# Patient Record
Sex: MALE | Race: WHITE | Employment: FULL TIME | URBAN - METROPOLITAN AREA
[De-identification: names, ages, dates, MRNs, and addresses within clinical notes are randomized per-mention and may not be internally consistent; named-entity substitution may affect disease eponyms.]

---

## 2017-03-30 ENCOUNTER — OFFICE VISIT (OUTPATIENT)
Dept: INTERNAL MEDICINE CLINIC | Facility: CLINIC | Age: 57
End: 2017-03-30

## 2017-03-30 ENCOUNTER — APPOINTMENT (OUTPATIENT)
Dept: LAB | Age: 57
End: 2017-03-30
Attending: INTERNAL MEDICINE
Payer: MEDICAID

## 2017-03-30 VITALS
WEIGHT: 203.31 LBS | HEART RATE: 71 BPM | HEIGHT: 72 IN | SYSTOLIC BLOOD PRESSURE: 127 MMHG | BODY MASS INDEX: 27.54 KG/M2 | DIASTOLIC BLOOD PRESSURE: 70 MMHG | TEMPERATURE: 98 F

## 2017-03-30 DIAGNOSIS — M25.562 CHRONIC PAIN OF LEFT KNEE: ICD-10-CM

## 2017-03-30 DIAGNOSIS — R31.29 MICROHEMATURIA: ICD-10-CM

## 2017-03-30 DIAGNOSIS — E78.2 MIXED HYPERLIPIDEMIA: ICD-10-CM

## 2017-03-30 DIAGNOSIS — D22.9 NUMEROUS MOLES: ICD-10-CM

## 2017-03-30 DIAGNOSIS — G89.29 CHRONIC PAIN OF LEFT KNEE: ICD-10-CM

## 2017-03-30 DIAGNOSIS — I10 ESSENTIAL HYPERTENSION: Primary | ICD-10-CM

## 2017-03-30 PROCEDURE — 80061 LIPID PANEL: CPT | Performed by: INTERNAL MEDICINE

## 2017-03-30 PROCEDURE — 99212 OFFICE O/P EST SF 10 MIN: CPT | Performed by: INTERNAL MEDICINE

## 2017-03-30 PROCEDURE — 85025 COMPLETE CBC W/AUTO DIFF WBC: CPT | Performed by: INTERNAL MEDICINE

## 2017-03-30 PROCEDURE — 80053 COMPREHEN METABOLIC PANEL: CPT | Performed by: INTERNAL MEDICINE

## 2017-03-30 PROCEDURE — 84443 ASSAY THYROID STIM HORMONE: CPT | Performed by: INTERNAL MEDICINE

## 2017-03-30 PROCEDURE — 99214 OFFICE O/P EST MOD 30 MIN: CPT | Performed by: INTERNAL MEDICINE

## 2017-03-30 PROCEDURE — 81001 URINALYSIS AUTO W/SCOPE: CPT | Performed by: INTERNAL MEDICINE

## 2017-03-30 PROCEDURE — 83036 HEMOGLOBIN GLYCOSYLATED A1C: CPT | Performed by: INTERNAL MEDICINE

## 2017-03-30 RX ORDER — AMLODIPINE BESYLATE AND BENAZEPRIL HYDROCHLORIDE 5; 10 MG/1; MG/1
CAPSULE ORAL
Qty: 90 CAPSULE | Refills: 1 | Status: SHIPPED | OUTPATIENT
Start: 2017-03-30 | End: 2017-11-02

## 2017-03-30 NOTE — PROGRESS NOTES
HPI:    Patient ID: Marly Gonzalez is a 62year old male. Hypertension  This is a chronic problem. The current episode started more than 1 year ago. The problem is controlled.  Pertinent negatives include no chest pain, peripheral edema or shortness of colby HENT: Negative. Eyes: Negative. Respiratory: Negative. Negative for cough and shortness of breath. No hemoptysis   Cardiovascular: Negative. Negative for chest pain. Gastrointestinal: Negative. Genitourinary: Negative.     Musculoskel significant amount of weight which should also help his cholesterol levels.    (D22.9) Numerous moles  Plan: Patient had been seen before for numerous moles on the back and some of them atypical and was advised him to see a dermatologist but that did not d

## 2017-03-31 ENCOUNTER — TELEPHONE (OUTPATIENT)
Dept: INTERNAL MEDICINE CLINIC | Facility: CLINIC | Age: 57
End: 2017-03-31

## 2017-03-31 DIAGNOSIS — R73.09 ELEVATED GLUCOSE: Primary | ICD-10-CM

## 2017-04-01 ENCOUNTER — TELEPHONE (OUTPATIENT)
Dept: INTERNAL MEDICINE CLINIC | Facility: CLINIC | Age: 57
End: 2017-04-01

## 2017-04-01 RX ORDER — ATORVASTATIN CALCIUM 10 MG/1
10 TABLET, FILM COATED ORAL NIGHTLY
Qty: 90 TABLET | Refills: 0 | Status: SHIPPED | OUTPATIENT
Start: 2017-04-01 | End: 2019-10-05

## 2017-04-24 ENCOUNTER — TELEPHONE (OUTPATIENT)
Dept: INTERNAL MEDICINE CLINIC | Facility: CLINIC | Age: 57
End: 2017-04-24

## 2017-04-24 DIAGNOSIS — E78.00 ELEVATED CHOLESTEROL: Primary | ICD-10-CM

## 2017-04-24 NOTE — TELEPHONE ENCOUNTER
Spoke with patient (verified name and ), reviewed information, patient verbalized understanding and agrees with plan. He has an appt scheduled to see Dr. Paola Zarate on 17. He is aware he is to repeat lipid profile and f/u in office in 3 months.  Aware

## 2017-04-24 NOTE — TELEPHONE ENCOUNTER
LMTCB, please transfer to ext [phone] today or [phone].  Thank you      Patient Result Comments        Entered by Marco Antonio Bagley MD at 4/1/2017  6:20 PM       HI Mr Rodney Schuster,     Here are your lab results   Your cholesterol levels remains elevated so I wan

## 2017-05-01 ENCOUNTER — OFFICE VISIT (OUTPATIENT)
Dept: SURGERY | Facility: CLINIC | Age: 57
End: 2017-05-01

## 2017-05-01 ENCOUNTER — TELEPHONE (OUTPATIENT)
Dept: INTERNAL MEDICINE CLINIC | Facility: CLINIC | Age: 57
End: 2017-05-01

## 2017-05-01 VITALS
SYSTOLIC BLOOD PRESSURE: 148 MMHG | DIASTOLIC BLOOD PRESSURE: 73 MMHG | BODY MASS INDEX: 27.36 KG/M2 | HEIGHT: 72 IN | HEART RATE: 69 BPM | RESPIRATION RATE: 16 BRPM | TEMPERATURE: 98 F | WEIGHT: 202 LBS

## 2017-05-01 DIAGNOSIS — Z11.3 SCREEN FOR STD (SEXUALLY TRANSMITTED DISEASE): Primary | ICD-10-CM

## 2017-05-01 DIAGNOSIS — R31.29 MICROHEMATURIA: Primary | ICD-10-CM

## 2017-05-01 PROCEDURE — 99244 OFF/OP CNSLTJ NEW/EST MOD 40: CPT | Performed by: UROLOGY

## 2017-05-01 PROCEDURE — 99212 OFFICE O/P EST SF 10 MIN: CPT | Performed by: UROLOGY

## 2017-05-01 NOTE — PROGRESS NOTES
SUBJECTIVE:  Danny Mckeon is a 62year old male who presents for a consultation at the request of, and a copy of this note will be sent to, Dr. Faisal Israel, for evaluation of  Microhematuria and erectile dysfunction.   He was actually seen in 4/2011 for ED a 69  Temp(Src) 98.2 °F (36.8 °C) (Oral)  Resp 16  Ht 6' (1.829 m)  Wt 202 lb (91.627 kg)  BMI 27.39 kg/m2  He appears well, in no apparent distress. Alert and oriented times three, pleasant and cooperative.   CARDIOVASCULAR:normal apical impulse  RESPIRATOR

## 2017-11-02 RX ORDER — AMLODIPINE BESYLATE AND BENAZEPRIL HYDROCHLORIDE 5; 10 MG/1; MG/1
CAPSULE ORAL
Qty: 30 CAPSULE | Refills: 0 | Status: SHIPPED | OUTPATIENT
Start: 2017-11-02 | End: 2017-11-30

## 2017-11-30 RX ORDER — AMLODIPINE BESYLATE AND BENAZEPRIL HYDROCHLORIDE 5; 10 MG/1; MG/1
CAPSULE ORAL
Qty: 30 CAPSULE | Refills: 0 | Status: SHIPPED | OUTPATIENT
Start: 2017-11-30 | End: 2018-12-10

## 2017-12-07 RX ORDER — AMLODIPINE BESYLATE AND BENAZEPRIL HYDROCHLORIDE 5; 10 MG/1; MG/1
CAPSULE ORAL
Qty: 30 CAPSULE | Refills: 0 | Status: SHIPPED | OUTPATIENT
Start: 2017-12-07 | End: 2019-02-08

## 2017-12-10 RX ORDER — AMLODIPINE BESYLATE AND BENAZEPRIL HYDROCHLORIDE 5; 10 MG/1; MG/1
CAPSULE ORAL
Qty: 30 CAPSULE | Refills: 0 | Status: SHIPPED | OUTPATIENT
Start: 2017-12-10 | End: 2019-02-08

## 2017-12-12 RX ORDER — AMLODIPINE BESYLATE AND BENAZEPRIL HYDROCHLORIDE 5; 10 MG/1; MG/1
CAPSULE ORAL
Qty: 30 CAPSULE | Refills: 0 | Status: SHIPPED | OUTPATIENT
Start: 2017-12-12 | End: 2019-02-08

## 2017-12-12 NOTE — TELEPHONE ENCOUNTER
I had already approved this twice so I Dont know why this keeps on going back to me; he also needs ffup ov so will only refill for 30 days

## 2017-12-13 RX ORDER — AMLODIPINE BESYLATE AND BENAZEPRIL HYDROCHLORIDE 5; 10 MG/1; MG/1
CAPSULE ORAL
Qty: 30 CAPSULE | Refills: 0 | Status: SHIPPED | OUTPATIENT
Start: 2017-12-13 | End: 2019-02-08

## 2017-12-13 NOTE — TELEPHONE ENCOUNTER
Pt needs ffup ov; I had sent messages regarding this  Refill so many times already.  I will give him only 30 days and no further refill if no ffup ov

## 2018-12-03 RX ORDER — AMLODIPINE BESYLATE AND BENAZEPRIL HYDROCHLORIDE 5; 10 MG/1; MG/1
CAPSULE ORAL
Qty: 30 CAPSULE | Refills: 0 | OUTPATIENT
Start: 2018-12-03

## 2018-12-07 NOTE — TELEPHONE ENCOUNTER
Patient calling and requesting a follow up regarding his medication amlodipine, states that he does not have any insurance right now and will  Be going out of town, [address] [place] [address] 3/30/17 and no latest blood tests, cannot make any FU OV any time soon.   Dr Flores=

## 2018-12-10 RX ORDER — AMLODIPINE BESYLATE AND BENAZEPRIL HYDROCHLORIDE 5; 10 MG/1; MG/1
CAPSULE ORAL
Qty: 30 CAPSULE | Refills: 0 | Status: SHIPPED | OUTPATIENT
Start: 2018-12-10 | End: 2019-02-08

## 2018-12-10 NOTE — TELEPHONE ENCOUNTER
CSS- call pt assist in scheduling appt then route back to Rn for refill consideration.   See message below

## 2018-12-10 NOTE — TELEPHONE ENCOUNTER
Have not seen him for more than  one year; really needs to have a ffup ov.  Ok to give him 30 day refill of his amlodipine but will need ov for further refills

## 2019-02-08 RX ORDER — AMLODIPINE BESYLATE AND BENAZEPRIL HYDROCHLORIDE 5; 10 MG/1; MG/1
CAPSULE ORAL
Qty: 90 CAPSULE | Refills: 0 | Status: SHIPPED | OUTPATIENT
Start: 2019-02-08 | End: 2019-08-20

## 2019-02-08 NOTE — TELEPHONE ENCOUNTER
CSS please call pt and assist in schedule overdue follow up or Px. Once scheduled or after 3 attempts, please route back for refill review.     Hypertensive Medications  Protocol Criteria:  · Appointment scheduled in the past 6 months or in the next 3 month

## 2019-02-08 NOTE — TELEPHONE ENCOUNTER
Spoke with Pt made appt for 2/23 for follow up    Pt would like to know if he can have his BP medication because he is not feelling and he is getting head ache and BP is high

## 2019-02-08 NOTE — TELEPHONE ENCOUNTER
Current Outpatient Medications:   •  Amlodipine Besy-Benazepril HCl 5-10 MG Oral Cap, TAKE ONE CAPSULE BY MOUTH ONCE DAILY, Disp: 30 capsule, Rfl: 0

## 2019-02-08 NOTE — TELEPHONE ENCOUNTER
Spoke with patient--has f/u appt with EL-2/23/19--patient declines ER for headache and high blood pressure. \"I have been out of the medication for a week. I didn't know I needed an appointment. \"    Relayed to patient to go to ER if symptoms worsen prior

## 2019-02-08 NOTE — TELEPHONE ENCOUNTER
Dr Rocael Alfaro,    See message below and advise on pended amlodipine-besy-benazepril 5-10 mg.     Please reply to katharina: LILIYA Gonzalez

## 2019-05-24 RX ORDER — AMLODIPINE BESYLATE AND BENAZEPRIL HYDROCHLORIDE 5; 10 MG/1; MG/1
CAPSULE ORAL
Qty: 30 CAPSULE | Refills: 0 | OUTPATIENT
Start: 2019-05-24

## 2019-07-23 RX ORDER — AMLODIPINE BESYLATE AND BENAZEPRIL HYDROCHLORIDE 5; 10 MG/1; MG/1
CAPSULE ORAL
Qty: 90 CAPSULE | Refills: 0 | OUTPATIENT
Start: 2019-07-23

## 2019-08-20 ENCOUNTER — TELEPHONE (OUTPATIENT)
Dept: INTERNAL MEDICINE CLINIC | Facility: CLINIC | Age: 59
End: 2019-08-20

## 2019-08-20 RX ORDER — AMLODIPINE BESYLATE AND BENAZEPRIL HYDROCHLORIDE 5; 10 MG/1; MG/1
CAPSULE ORAL
Qty: 30 CAPSULE | Refills: 0 | Status: SHIPPED | OUTPATIENT
Start: 2019-08-20 | End: 2019-10-05

## 2019-08-20 NOTE — TELEPHONE ENCOUNTER
Pt requesting a refill for BP medication and already made an APPT to see PCP on 8/27. Pharmacy: [facility] in [place]    Please call Pt when this is refilled. PT is OUT of med.

## 2019-08-20 NOTE — TELEPHONE ENCOUNTER
Mane Richards from [address] stated that pt does not have insurance. Pharmacy suggest to split it. Med:   amLODIPine Besy-Benazepril HCl 5-10 MG Oral Cap    Please call pharmacy for new order. Thank you.

## 2019-08-21 RX ORDER — AMLODIPINE BESYLATE AND BENAZEPRIL HYDROCHLORIDE 5; 10 MG/1; MG/1
CAPSULE ORAL
Qty: 90 CAPSULE | Refills: 0 | OUTPATIENT
Start: 2019-08-21

## 2019-08-24 ENCOUNTER — TELEPHONE (OUTPATIENT)
Dept: INTERNAL MEDICINE CLINIC | Facility: CLINIC | Age: 59
End: 2019-08-24

## 2019-08-24 NOTE — TELEPHONE ENCOUNTER
Sumi[facility]- [place], [place] calling Again to inform the office (as previously been noted) that:    1. Pt is OUT of med (pharmacy gave them only 5 pills)    2.  Per pharmacy Pt has [employer] and med is too expensive and pharmacy is requesting a c

## 2019-08-24 NOTE — TELEPHONE ENCOUNTER
Called pharmacy and spoke to [place] the pharmacist. She informed that there are many cheaper meds and she advised that it would be up to the MD to choose but gave 2 examples of alternative options instead of the amLODIPine Besy-Benazepril HCl 5-10 MG Oral C

## 2019-08-29 NOTE — TELEPHONE ENCOUNTER
Spoke with patient, informed of EL notes below  Patient made aware no further refills will be given without seeing EL first. States he is changing insurance, will have his new insurance  card next week and will make an appointment

## 2019-08-29 NOTE — TELEPHONE ENCOUNTER
Tangela/Sadi [phone] is calling for status of their message. Tangela, would like to know if the doctor would prescribe a cheaper medication to the patient. Pt is out of medication.

## 2019-08-29 NOTE — TELEPHONE ENCOUNTER
Last time I saw pt ws 2 1/2 yrs ago; he didn't show up for is ov this week even. I can change med. He needs to see me in clnic.  I had been considerate already giving him 30 day refill without ov just to tide him over til he comes in

## 2019-10-05 ENCOUNTER — OFFICE VISIT (OUTPATIENT)
Dept: INTERNAL MEDICINE CLINIC | Facility: CLINIC | Age: 59
End: 2019-10-05
Payer: COMMERCIAL

## 2019-10-05 ENCOUNTER — HOSPITAL ENCOUNTER (OUTPATIENT)
Dept: GENERAL RADIOLOGY | Age: 59
Discharge: HOME | End: 2019-10-05
Attending: INTERNAL MEDICINE
Payer: COMMERCIAL

## 2019-10-05 VITALS
HEART RATE: 60 BPM | BODY MASS INDEX: 26.8 KG/M2 | DIASTOLIC BLOOD PRESSURE: 66 MMHG | HEIGHT: 72 IN | WEIGHT: 197.88 LBS | TEMPERATURE: 98 F | SYSTOLIC BLOOD PRESSURE: 144 MMHG

## 2019-10-05 DIAGNOSIS — E78.2 MIXED HYPERLIPIDEMIA: ICD-10-CM

## 2019-10-05 DIAGNOSIS — Z23 IMMUNIZATION DUE: ICD-10-CM

## 2019-10-05 DIAGNOSIS — M54.2 NECK PAIN: ICD-10-CM

## 2019-10-05 DIAGNOSIS — I10 ESSENTIAL HYPERTENSION: ICD-10-CM

## 2019-10-05 DIAGNOSIS — R20.0 RIGHT SIDED NUMBNESS: ICD-10-CM

## 2019-10-05 DIAGNOSIS — Z00.00 ANNUAL PHYSICAL EXAM: Primary | ICD-10-CM

## 2019-10-05 PROCEDURE — 90686 IIV4 VACC NO PRSV 0.5 ML IM: CPT | Performed by: INTERNAL MEDICINE

## 2019-10-05 PROCEDURE — 90471 IMMUNIZATION ADMIN: CPT | Performed by: INTERNAL MEDICINE

## 2019-10-05 PROCEDURE — 99396 PREV VISIT EST AGE 40-64: CPT | Performed by: INTERNAL MEDICINE

## 2019-10-05 PROCEDURE — 93000 ELECTROCARDIOGRAM COMPLETE: CPT | Performed by: INTERNAL MEDICINE

## 2019-10-05 PROCEDURE — 72040 X-RAY EXAM NECK SPINE 2-3 VW: CPT | Performed by: INTERNAL MEDICINE

## 2019-10-05 RX ORDER — AMLODIPINE BESYLATE AND BENAZEPRIL HYDROCHLORIDE 5; 20 MG/1; MG/1
1 CAPSULE ORAL DAILY
Qty: 90 CAPSULE | Refills: 1 | Status: SHIPPED | OUTPATIENT
Start: 2019-10-05 | End: 2020-06-23

## 2019-10-05 NOTE — PROGRESS NOTES
HPI:    Patient ID: Dilcia Hughes is a 61year old male. Patient presents today for his annual phyiscal. He has a known history of hypertension and hyperlipidemia but has been known to be noncompliant on his ffup visits as well as taking his medications. aggravating his hyperlipidemia. Pertinent negatives include no chest pain or shortness of breath. Current antihyperlipidemic treatment includes statins, diet change and exercise. The current treatment provides no improvement of lipids.  Compliance problems Constitutional: He is oriented to person, place, and time. He appears well-developed and well-nourished. HENT:   Head: Normocephalic and atraumatic.    Right Ear: External ear normal.   Left Ear: External ear normal.   Nose: Nose normal.   Mouth/Throat: DIAG         IMG (CPT=93880), CARD ECHO 2D DOPPLER         (CPT=93306)        Neuro exam normal; last episode a week ago already and lasted for 5  Minutes so possible TIA.  We did do ekg and showed SR rate of 64/min, normal axis, no arrhythmias, no ST T wav

## 2019-10-06 PROBLEM — M54.2 NECK PAIN: Status: ACTIVE | Noted: 2019-10-06

## 2019-10-06 PROBLEM — R20.0 RIGHT SIDED NUMBNESS: Status: ACTIVE | Noted: 2019-10-06

## 2019-10-06 PROBLEM — Z23 IMMUNIZATION DUE: Status: ACTIVE | Noted: 2019-10-06

## 2019-10-07 ENCOUNTER — TELEPHONE (OUTPATIENT)
Dept: INTERNAL MEDICINE CLINIC | Facility: CLINIC | Age: 59
End: 2019-10-07

## 2019-10-07 DIAGNOSIS — R07.2 PRECORDIAL PAIN: Primary | ICD-10-CM

## 2019-10-07 NOTE — TELEPHONE ENCOUNTER
Per patient he would like to add a stress testing to his orders. Please advise. He was in the hospital last night for chest pain. Hospital suggested stress test and he would like to have that added as well. Thank you.

## 2019-10-07 NOTE — TELEPHONE ENCOUNTER
Dr. Gail Elam, patient was in the ER on 10/05/2019 for chest pain. Please see care everywhere [address] [place] [address] for notes. Patient is requesting a stress test. Please advise if patient should have a ER follow up or if test may be ordered.

## 2019-10-07 NOTE — TELEPHONE ENCOUNTER
Late entry for 1pm today. Per Dr. Ruthie Fischer instructions called [employer] for Prior Authorization for MRI of Brain. After 45 minutes plus, authorization was denied until Dr. Margaret Peterson completes forms that were faxed.  Forms placed on Dr. Ruthie Fischer brodie

## 2019-10-14 ENCOUNTER — TELEPHONE (OUTPATIENT)
Dept: INTERNAL MEDICINE CLINIC | Facility: CLINIC | Age: 59
End: 2019-10-14

## 2019-10-14 NOTE — TELEPHONE ENCOUNTER
Phone call earlier this morning to [facility]. Automated response states order for MRI approved. Phone call to patient. S/W patient and advised MRI approved. May  order on second floor at . Order approval # [id number]. Patient states he will pick

## 2020-01-14 ENCOUNTER — APPOINTMENT (OUTPATIENT)
Dept: LAB | Age: 60
End: 2020-01-14
Attending: INTERNAL MEDICINE
Payer: COMMERCIAL

## 2020-01-14 ENCOUNTER — OFFICE VISIT (OUTPATIENT)
Dept: INTERNAL MEDICINE CLINIC | Facility: CLINIC | Age: 60
End: 2020-01-14
Payer: COMMERCIAL

## 2020-01-14 VITALS
SYSTOLIC BLOOD PRESSURE: 126 MMHG | HEIGHT: 72 IN | BODY MASS INDEX: 28.04 KG/M2 | WEIGHT: 207 LBS | TEMPERATURE: 98 F | HEART RATE: 57 BPM | DIASTOLIC BLOOD PRESSURE: 76 MMHG

## 2020-01-14 DIAGNOSIS — M54.2 NECK PAIN: Primary | ICD-10-CM

## 2020-01-14 DIAGNOSIS — M54.12 CERVICAL RADICULOPATHY: ICD-10-CM

## 2020-01-14 PROCEDURE — 99214 OFFICE O/P EST MOD 30 MIN: CPT | Performed by: INTERNAL MEDICINE

## 2020-01-14 NOTE — PROGRESS NOTES
HPI:    Patient ID: Magda Urbano is a 61year old male. Neck Pain    This is a chronic problem. The current episode started more than 1 month ago (3 months). The problem occurs constantly. The problem has been waxing and waning.  The pain is associated w murmur heard. Pulmonary/Chest: Effort normal and breath sounds normal. No respiratory distress. He has no wheezes. He has no rales. Abdominal: Soft. Bowel sounds are normal. He exhibits no distension and no mass. There is no tenderness.  There is no rebo

## 2020-06-23 ENCOUNTER — OFFICE VISIT (OUTPATIENT)
Dept: INTERNAL MEDICINE CLINIC | Facility: CLINIC | Age: 60
End: 2020-06-23
Payer: COMMERCIAL

## 2020-06-23 VITALS
DIASTOLIC BLOOD PRESSURE: 80 MMHG | HEART RATE: 64 BPM | BODY MASS INDEX: 28.84 KG/M2 | HEIGHT: 71 IN | TEMPERATURE: 98 F | RESPIRATION RATE: 12 BRPM | WEIGHT: 206 LBS | SYSTOLIC BLOOD PRESSURE: 128 MMHG

## 2020-06-23 DIAGNOSIS — E78.2 MIXED HYPERLIPIDEMIA: ICD-10-CM

## 2020-06-23 DIAGNOSIS — M54.12 CERVICAL RADICULOPATHY: ICD-10-CM

## 2020-06-23 DIAGNOSIS — Z12.5 PROSTATE CANCER SCREENING: ICD-10-CM

## 2020-06-23 DIAGNOSIS — Z12.11 COLON CANCER SCREENING: ICD-10-CM

## 2020-06-23 DIAGNOSIS — I10 ESSENTIAL HYPERTENSION: Primary | ICD-10-CM

## 2020-06-23 PROCEDURE — 99214 OFFICE O/P EST MOD 30 MIN: CPT | Performed by: INTERNAL MEDICINE

## 2020-06-23 RX ORDER — AMLODIPINE BESYLATE AND BENAZEPRIL HYDROCHLORIDE 5; 20 MG/1; MG/1
1 CAPSULE ORAL DAILY
Qty: 90 CAPSULE | Refills: 1 | Status: SHIPPED | OUTPATIENT
Start: 2020-06-23 | End: 2021-03-31

## 2020-06-23 NOTE — PROGRESS NOTES
HPI:    Patient ID: Haydee Corona is a 61year old male. Hypertension   This is a chronic problem. The current episode started more than 1 year ago. The problem has been gradually improving since onset. The problem is controlled.  Associated symptoms incl include no chest pain, fever, numbness, paresis, tingling or weakness. He has tried nothing for the symptoms. The treatment provided no relief. Review of Systems   Constitutional: Negative. Negative for fever. HENT: Negative. Eyes: Negative. no guarding. Musculoskeletal:         General: No edema. Lymphadenopathy:     He has no cervical adenopathy. Neurological: He is alert and oriented to person, place, and time. Skin: Skin is warm and dry. No rash noted. He is not diaphoretic.

## 2020-11-10 ENCOUNTER — IMMUNIZATION (OUTPATIENT)
Dept: INTERNAL MEDICINE CLINIC | Facility: CLINIC | Age: 60
End: 2020-11-10
Payer: COMMERCIAL

## 2020-11-10 DIAGNOSIS — Z23 NEED FOR VACCINATION: ICD-10-CM

## 2020-11-10 PROCEDURE — 90471 IMMUNIZATION ADMIN: CPT | Performed by: INTERNAL MEDICINE

## 2020-11-10 PROCEDURE — 90686 IIV4 VACC NO PRSV 0.5 ML IM: CPT | Performed by: INTERNAL MEDICINE

## 2021-03-31 ENCOUNTER — TELEPHONE (OUTPATIENT)
Dept: INTERNAL MEDICINE CLINIC | Facility: CLINIC | Age: 61
End: 2021-03-31

## 2021-03-31 RX ORDER — AMLODIPINE BESYLATE AND BENAZEPRIL HYDROCHLORIDE 5; 20 MG/1; MG/1
CAPSULE ORAL
Qty: 90 CAPSULE | Refills: 0 | Status: SHIPPED | OUTPATIENT
Start: 2021-03-31 | End: 2021-09-23

## 2021-03-31 NOTE — TELEPHONE ENCOUNTER
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 I told  Him I can write the letter to state he is essential worker and has his medical conditions.  He

## 2021-03-31 NOTE — TELEPHONE ENCOUNTER
Per patient he insist to send message to doctor, he states that it's not emergency, he states that he has question only, no more info given.

## 2021-09-22 NOTE — TELEPHONE ENCOUNTER
HAYLEY please assist with appt  Please review. Protocol failed/ No protocol      Requested Prescriptions   Pending Prescriptions Disp Refills    AMLODIPINE BESY-BENAZEPRIL HCL 5-20 MG Oral Cap [Pharmacy Med Name: amLODIPine Besy-Benazepril HCl 5-20 MG Oral Capsule] 90 capsule 0     Sig: Take 1 capsule by mouth once daily        Hypertensive Medications Protocol Failed - 9/22/2021  1:33 PM        Failed - CMP or BMP in past 12 months        Failed - Appointment in past 6 or next 3 months        Passed - GFR Non- > 50     Lab Results   Component Value Date    GFRNAA >60 03/30/2017                            Recent Outpatient Visits              1 year ago Essential hypertension    [facility], Gregoria Luque MD    Office Visit    1 year ago Neck pain    [facility], Gregoria Luque MD    Office Visit    1 year ago Annual physical exam    Gregoria Espinosa MD    Office Visit    4 years ago [facility] for Magdy Robbins MD    Office Visit    4 years ago Essential hypertension    Gregoria Espinosa MD    Office Visit

## 2021-09-23 RX ORDER — AMLODIPINE BESYLATE AND BENAZEPRIL HYDROCHLORIDE 5; 20 MG/1; MG/1
CAPSULE ORAL
Qty: 30 CAPSULE | Refills: 0 | Status: SHIPPED | OUTPATIENT
Start: 2021-09-23 | End: 2021-11-09

## 2021-09-23 NOTE — TELEPHONE ENCOUNTER
Needs ffup ov, last ov more than a year ago; I sent erx for 30 day only and cant refill further if no ov

## 2021-11-09 ENCOUNTER — OFFICE VISIT (OUTPATIENT)
Dept: INTERNAL MEDICINE CLINIC | Facility: CLINIC | Age: 61
End: 2021-11-09
Payer: COMMERCIAL

## 2021-11-09 VITALS
HEIGHT: 71 IN | SYSTOLIC BLOOD PRESSURE: 138 MMHG | BODY MASS INDEX: 29.26 KG/M2 | WEIGHT: 209 LBS | DIASTOLIC BLOOD PRESSURE: 84 MMHG | HEART RATE: 77 BPM

## 2021-11-09 DIAGNOSIS — Z12.5 PROSTATE CANCER SCREENING: ICD-10-CM

## 2021-11-09 DIAGNOSIS — M25.562 ACUTE PAIN OF LEFT KNEE: ICD-10-CM

## 2021-11-09 DIAGNOSIS — Z00.00 ANNUAL PHYSICAL EXAM: Primary | ICD-10-CM

## 2021-11-09 DIAGNOSIS — E78.2 MIXED HYPERLIPIDEMIA: ICD-10-CM

## 2021-11-09 DIAGNOSIS — Z12.11 COLON CANCER SCREENING: ICD-10-CM

## 2021-11-09 DIAGNOSIS — I10 ESSENTIAL HYPERTENSION: ICD-10-CM

## 2021-11-09 PROBLEM — R31.29 MICROHEMATURIA: Status: RESOLVED | Noted: 2017-03-30 | Resolved: 2021-11-09

## 2021-11-09 PROBLEM — M54.2 NECK PAIN: Status: RESOLVED | Noted: 2019-10-06 | Resolved: 2021-11-09

## 2021-11-09 PROBLEM — R20.0 RIGHT SIDED NUMBNESS: Status: RESOLVED | Noted: 2019-10-06 | Resolved: 2021-11-09

## 2021-11-09 PROCEDURE — 90732 PPSV23 VACC 2 YRS+ SUBQ/IM: CPT | Performed by: INTERNAL MEDICINE

## 2021-11-09 PROCEDURE — 90472 IMMUNIZATION ADMIN EACH ADD: CPT | Performed by: INTERNAL MEDICINE

## 2021-11-09 PROCEDURE — 90686 IIV4 VACC NO PRSV 0.5 ML IM: CPT | Performed by: INTERNAL MEDICINE

## 2021-11-09 PROCEDURE — 99396 PREV VISIT EST AGE 40-64: CPT | Performed by: INTERNAL MEDICINE

## 2021-11-09 PROCEDURE — 3008F BODY MASS INDEX DOCD: CPT | Performed by: INTERNAL MEDICINE

## 2021-11-09 PROCEDURE — 3079F DIAST BP 80-89 MM HG: CPT | Performed by: INTERNAL MEDICINE

## 2021-11-09 PROCEDURE — 90471 IMMUNIZATION ADMIN: CPT | Performed by: INTERNAL MEDICINE

## 2021-11-09 PROCEDURE — 3075F SYST BP GE 130 - 139MM HG: CPT | Performed by: INTERNAL MEDICINE

## 2021-11-09 RX ORDER — AMLODIPINE BESYLATE AND BENAZEPRIL HYDROCHLORIDE 5; 20 MG/1; MG/1
1 CAPSULE ORAL DAILY
Qty: 90 CAPSULE | Refills: 1 | Status: SHIPPED | OUTPATIENT
Start: 2021-11-09 | End: 2021-11-09

## 2021-11-09 RX ORDER — AMLODIPINE BESYLATE AND BENAZEPRIL HYDROCHLORIDE 5; 40 MG/1; MG/1
1 CAPSULE ORAL DAILY
Qty: 90 CAPSULE | Refills: 1 | Status: SHIPPED | OUTPATIENT
Start: 2021-11-09

## 2021-11-09 NOTE — PROGRESS NOTES
Subjective:     Patient ID: Armand Marsh is a 64year old male. Patient presents today for his annual physical.       History/Other:   Review of Systems   Constitutional: Negative. HENT: Negative. Eyes: Negative. Respiratory: Negative. reactive to light. Neck:      Thyroid: No thyromegaly. Vascular: No JVD. Cardiovascular:      Rate and Rhythm: Normal rate and regular rhythm. Heart sounds: Normal heart sounds. No murmur heard. No friction rub. No gallop.     Pulmonary: psa.     (M25.562) Acute pain of left knee  Plan: XR KNEE (1 OR 2 VIEWS), LEFT (CPT=73560)        Pt had fallen 6 weeks ago and twisted his left knee; since then had been having stiffness and pain on left knee. Will get xray.          No orders of the defin

## 2022-07-21 RX ORDER — AMLODIPINE BESYLATE AND BENAZEPRIL HYDROCHLORIDE 5; 40 MG/1; MG/1
CAPSULE ORAL
Qty: 90 CAPSULE | Refills: 0 | Status: SHIPPED | OUTPATIENT
Start: 2022-07-21

## 2022-12-15 ENCOUNTER — NURSE TRIAGE (OUTPATIENT)
Dept: INTERNAL MEDICINE CLINIC | Facility: CLINIC | Age: 62
End: 2022-12-15

## 2022-12-15 ENCOUNTER — OFFICE VISIT (OUTPATIENT)
Dept: INTERNAL MEDICINE CLINIC | Facility: CLINIC | Age: 62
End: 2022-12-15
Payer: COMMERCIAL

## 2022-12-15 VITALS
DIASTOLIC BLOOD PRESSURE: 90 MMHG | SYSTOLIC BLOOD PRESSURE: 169 MMHG | HEART RATE: 71 BPM | WEIGHT: 218 LBS | BODY MASS INDEX: 30.52 KG/M2 | HEIGHT: 71 IN

## 2022-12-15 DIAGNOSIS — I10 ESSENTIAL HYPERTENSION: Primary | ICD-10-CM

## 2022-12-15 PROCEDURE — 3008F BODY MASS INDEX DOCD: CPT | Performed by: PHYSICIAN ASSISTANT

## 2022-12-15 PROCEDURE — 3077F SYST BP >= 140 MM HG: CPT | Performed by: PHYSICIAN ASSISTANT

## 2022-12-15 PROCEDURE — 99213 OFFICE O/P EST LOW 20 MIN: CPT | Performed by: PHYSICIAN ASSISTANT

## 2022-12-15 PROCEDURE — 3080F DIAST BP >= 90 MM HG: CPT | Performed by: PHYSICIAN ASSISTANT

## 2022-12-15 RX ORDER — AMLODIPINE BESYLATE 5 MG/1
5 TABLET ORAL DAILY
Qty: 30 TABLET | Refills: 0 | Status: SHIPPED | OUTPATIENT
Start: 2022-12-15

## 2022-12-20 ENCOUNTER — OFFICE VISIT (OUTPATIENT)
Dept: INTERNAL MEDICINE CLINIC | Facility: CLINIC | Age: 62
End: 2022-12-20
Payer: COMMERCIAL

## 2022-12-20 VITALS
HEART RATE: 66 BPM | HEIGHT: 71 IN | TEMPERATURE: 97 F | SYSTOLIC BLOOD PRESSURE: 146 MMHG | OXYGEN SATURATION: 97 % | DIASTOLIC BLOOD PRESSURE: 80 MMHG | WEIGHT: 223.69 LBS | BODY MASS INDEX: 31.31 KG/M2

## 2022-12-20 DIAGNOSIS — Z12.5 PROSTATE CANCER SCREENING: ICD-10-CM

## 2022-12-20 DIAGNOSIS — E78.2 MIXED HYPERLIPIDEMIA: ICD-10-CM

## 2022-12-20 DIAGNOSIS — Z13.6 SCREENING FOR HEART DISEASE: ICD-10-CM

## 2022-12-20 DIAGNOSIS — Z00.00 ANNUAL PHYSICAL EXAM: Primary | ICD-10-CM

## 2022-12-20 DIAGNOSIS — Z12.11 COLON CANCER SCREENING: ICD-10-CM

## 2022-12-20 DIAGNOSIS — I10 ESSENTIAL HYPERTENSION: ICD-10-CM

## 2022-12-20 PROCEDURE — 3008F BODY MASS INDEX DOCD: CPT | Performed by: INTERNAL MEDICINE

## 2022-12-20 PROCEDURE — 3079F DIAST BP 80-89 MM HG: CPT | Performed by: INTERNAL MEDICINE

## 2022-12-20 PROCEDURE — 3077F SYST BP >= 140 MM HG: CPT | Performed by: INTERNAL MEDICINE

## 2022-12-20 PROCEDURE — 99396 PREV VISIT EST AGE 40-64: CPT | Performed by: INTERNAL MEDICINE

## 2022-12-20 RX ORDER — AMLODIPINE BESYLATE AND BENAZEPRIL HYDROCHLORIDE 10; 40 MG/1; MG/1
1 CAPSULE ORAL DAILY
Qty: 90 CAPSULE | Refills: 0 | Status: SHIPPED | OUTPATIENT
Start: 2022-12-20

## 2022-12-21 ENCOUNTER — TELEPHONE (OUTPATIENT)
Dept: INTERNAL MEDICINE CLINIC | Facility: CLINIC | Age: 62
End: 2022-12-21

## 2022-12-21 NOTE — TELEPHONE ENCOUNTER
Spoke with the patient,verified full name and     Informed him script already sen to [facility], [facility] will [facility] script.     No further questions

## 2022-12-21 NOTE — TELEPHONE ENCOUNTER
Patient requesting his prescription for amLODIPine Besy-Benazepril HCl 10-40 MG Oral Cap  Be sent to [address] [facility] - due to insurance

## 2023-03-29 RX ORDER — AMLODIPINE BESYLATE AND BENAZEPRIL HYDROCHLORIDE 10; 40 MG/1; MG/1
1 CAPSULE ORAL DAILY
Qty: 90 CAPSULE | Refills: 0 | Status: SHIPPED | OUTPATIENT
Start: 2023-03-29

## 2023-03-29 NOTE — TELEPHONE ENCOUNTER
Protocol failed or has No Protocol, please review  Requested Prescriptions   Pending Prescriptions Disp Refills    amLODIPine Besy-Benazepril HCl 10-40 MG Oral Cap 90 capsule 0     Sig: Take 1 capsule by mouth daily. Hypertensive Medications Protocol Failed - 3/28/2023 10:57 AM        Failed - Last BP reading less than 140/90     BP Readings from Last 1 Encounters:  12/20/22 : 146/80              Failed - CMP or BMP in past 6 months     No results found for this or any previous visit (from the past 4392 hour(s)).             Failed - EGFRCR or GFRNAA > 50     GFR Evaluation            Passed - In person appointment in the past 12 or next 3 months     Recent Outpatient Visits              3 months ago Annual physical exam    [address] Karrie Martinez MD    Office Visit    3 months ago Essential hypertension    [facility] Nat Angel PA-C    Office Visit    1 year ago Annual physical exam    [address] Karrie Martinez MD    Office Visit    2 years ago Essential hypertension    [address] Karrie Martinez MD    Office Visit    3 years ago Neck pain    [address], Karrie Vogt MD    Office Visit                      Passed - In person appointment or virtual visit in the past 6 months     Recent Outpatient Visits              3 months ago Annual physical exam    [address], [address], Karrie Vogt MD    Office Visit    3 months ago Essential hypertension    [address], [address] [facility] [place] Nat Angel PA-C    Office Visit    1 year ago Annual physical exam    [address] [place], Karrie Vogt MD    Office Visit    2 years ago Essential hypertension    [facility] Jen [facility]amanca, Fadi Paniagua MD    Office Visit    3 years ago Neck pain    [address] [facility], Jesusita Monk MD    Office Visit                           Recent Outpatient Visits              3 months ago Annual physical exam    Sunil Dorsey MD    Office Visit    3 months ago Essential hypertension    [facility]amud, [address] [facility], [place] Luis Paz PA-C    Office Visit    1 year ago Annual physical exam    [address] [facility], Jesusita Monk MD    Office Visit    2 years ago Essential hypertension    [address] [facility], Jesusita Monk MD    Office Visit    3 years ago Neck pain    [facility], [address], Jesusita Monk MD    Office Visit

## 2023-06-19 RX ORDER — AMLODIPINE BESYLATE AND BENAZEPRIL HYDROCHLORIDE 10; 40 MG/1; MG/1
1 CAPSULE ORAL DAILY
Qty: 90 CAPSULE | Refills: 0 | Status: SHIPPED | OUTPATIENT
Start: 2023-06-19

## 2023-06-19 NOTE — TELEPHONE ENCOUNTER
Please review. Protocol failed / Has no protocol. Requested Prescriptions   Pending Prescriptions Disp Refills    AMLODIPINE BESY-BENAZEPRIL HCL 10-40 MG Oral Cap [Pharmacy Med Name: amLODIPine Besy-Benazepril HCl 10-40 MG Oral Capsule] 90 capsule 0     Sig: Take 1 capsule by mouth once daily       Hypertensive Medications Protocol Failed - 6/18/2023 12:09 PM        Failed - Last BP reading less than 140/90     BP Readings from Last 1 Encounters:  12/20/22 : 146/80              Failed - CMP or BMP in past 6 months     No results found for this or any previous visit (from the past 4392 hour(s)).             Failed - In person appointment or virtual visit in the past 6 months     Recent Outpatient Visits              6 months ago Annual physical exam    Ximena Ventura MD    Office Visit    6 months ago Essential hypertension    [address], [address] [facility]    Office Visit    1 year ago Annual physical exam    [address] [facility], Teofilo Crews MD    Office Visit    2 years ago Essential hypertension    [address] [facility], Teofilo Crews MD    Office Visit    3 years ago Neck pain    [address] [facility], Teofilo Crews MD    Office Visit                      Failed - EGFRCR or GFRNAA > 50     GFR Evaluation            Passed - In person appointment in the past 12 or next 3 months     Recent Outpatient Visits              6 months ago Annual physical exam    [address], [address], Teofilo Crews MD    Office Visit    6 months ago Essential hypertension    [address], [address] [facility]    Office Visit    1 year ago Annual physical exam    [address] [facility], Teofilo Crews MD Office Visit    2 years ago Essential hypertension    Dante Campbell MD    Office Visit    3 years ago Neck pain    [address], [address], Dante Delgado MD    Office Visit                            Recent Outpatient Visits              6 months ago Annual physical exam    Adrianne Herrera MD    Office Visit    6 months ago Essential hypertension    [address], [address] [facility]    Office Visit    1 year ago Annual physical exam    Dante Campbell MD    Office Visit    2 years ago Essential hypertension    Dante Campbell MD    Office Visit    3 years ago Neck pain    [address], [address], Dante Delgado MD    Office Visit

## 2023-07-20 ENCOUNTER — NURSE TRIAGE (OUTPATIENT)
Dept: INTERNAL MEDICINE CLINIC | Facility: CLINIC | Age: 63
End: 2023-07-20

## 2023-07-20 NOTE — TELEPHONE ENCOUNTER
Action Requested: Summary for Provider     []  Critical Lab, Recommendations Needed  [] Need Additional Advice  []   FYI    []   Need Orders  [] Need Medications Sent to Pharmacy  []  Other     SUMMARY: Per protocol: OV. Patient wants to be seen today at the [address] [place] [address] location. Offered other locations but patient declined. Patient will go to [facility]. Reason for call: Shoulder Pain  Onset: Data Unavailable    Patient is having pain to right shoulder and numbness to the arm. He does lift heavy items for a living. The pain is worse with exertion. Denies chest pain or shortness of breath.      Reason for Disposition   Shoulder pains with exertion (e.g., walking) and pain goes away on resting and not present now    Protocols used: Shoulder Pain-A-OH

## 2023-09-12 NOTE — TELEPHONE ENCOUNTER
LAST VISIT 12/20/2022; No future appointments. OVERDUE fasting labs in the system since 12/20/2022. [employer] message sent with instructions. CSS=please assist, then send it to PCP for approval once we have the appointment . PER MEDICATION RECORD:  amLODIPine Besy-Benazepril HCl 10-40 MG Oral Cap 90 capsule 0 6/19/2023     Sig - Route: Take 1 capsule by mouth daily. - Oral    Sent to pharmacy as: amLODIPine Besy-Benazepril HCl 10-40 MG Oral Capsule (LOTREL)    Notes to Pharmacy: Pt needs ffup ov for further refills    E-Prescribing Status: Receipt confirmed by pharmacy (6/19/2023  2:52 PM CDT)      Pharmacy    [address] [facility] [address] [facility], [address] [place] [address] [phone], [phone]           Please review; protocol failed/no protocol. Requested Prescriptions   Pending Prescriptions Disp Refills    AMLODIPINE BESY-BENAZEPRIL HCL 10-40 MG Oral Cap [Pharmacy Med Name: amLODIPine Besy-Benazepril HCl 10-40 MG Oral Capsule] 90 capsule 0     Sig: Take 1 capsule by mouth once daily       Hypertensive Medications Protocol Failed - 9/11/2023  9:38 AM        Failed - Last BP reading less than 140/90     BP Readings from Last 1 Encounters:  12/20/22 : 146/80              Failed - CMP or BMP in past 6 months     No results found for this or any previous visit (from the past 4392 hour(s)).             Failed - In person appointment or virtual visit in the past 6 months     Recent Outpatient Visits              8 months ago Annual physical exam    Elian Ovalle MD    Office Visit    9 months ago Essential hypertension    [employer], [facility], Obed Horton PA-C    Office Visit    1 year ago Annual physical exam    [address] [facility], Casie Garcia MD    Office Visit    3 years ago Essential hypertension    [address] [facility], Fadi Jeffery Hernandez MD    Office Visit    3 years ago Neck pain    [facility], [address], Fadi Hernandez MD    Office Visit                      Failed - EGFRCR or GFRNAA > 50     GFR Evaluation            Passed - In person appointment in the past 12 or next 3 months     Recent Outpatient Visits              8 months ago Annual physical exam    [address], [address], Suraj James MD    Office Visit    9 months ago Essential hypertension    [address], [address] [facility]    Office Visit    1 year ago Annual physical exam    [address], [address], Suraj James MD    Office Visit    3 years ago Essential hypertension    [address], Suraj James MD    Office Visit    3 years ago Neck pain    [address], [address], Suraj James MD    Office Visit                            Recent Outpatient Visits              8 months ago Annual physical exam    [address], Suraj James MD    Office Visit    9 months ago Essential hypertension    [address], [address] [facility]    Office Visit    1 year ago Annual physical exam    [address], Suraj James MD    Office Visit    3 years ago Essential hypertension    [address], Suraj James MD    Office Visit    3 years ago Neck pain    [address], [address], Suraj James MD    Office Visit

## 2023-09-12 NOTE — TELEPHONE ENCOUNTER
2nd attempt/first below. Left voice mail message for pt to call back. Please, see message below when the call is returned.

## 2023-09-12 NOTE — TELEPHONE ENCOUNTER
Please review. Protocol failed / Has no protocol. Patient called to make ffup   Graematter message sent to patient to complete labs pended from [address] [place] [address] 12/20/2022. Requested Prescriptions   Pending Prescriptions Disp Refills    AMLODIPINE BESY-BENAZEPRIL HCL 10-40 MG Oral Cap [Pharmacy Med Name: amLODIPine Besy-Benazepril HCl 10-40 MG Oral Capsule] 90 capsule 0     Sig: Take 1 capsule by mouth once daily       Hypertensive Medications Protocol Failed - 9/12/2023 10:28 AM        Failed - Last BP reading less than 140/90     BP Readings from Last 1 Encounters:  12/20/22 : 146/80              Failed - CMP or BMP in past 6 months     No results found for this or any previous visit (from the past 4392 hour(s)).             Failed - In person appointment or virtual visit in the past 6 months     Recent Outpatient Visits              8 months ago Annual physical exam    Sunil Dorsey MD    Office Visit    9 months ago Essential hypertension    [address], [address] [facility] Kim Quarles PA-C    Office Visit    1 year ago Annual physical exam    [address] [facility], Jesusita Monk MD    Office Visit    3 years ago Essential hypertension    [address] [facility], Jesusita Monk MD    Office Visit    3 years ago [facility]ck [facility]    [address] [facility]    Office Visit                      Failed - EGFRCR or GFRNAA > 50     GFR Evaluation            Passed - In person appointment in the past 12 or next 3 months     Recent Outpatient Visits              8 months ago Annual physical exam    [address] [facility], Jesusita Monk MD    Office Visit    9 months ago Essential hypertension    [address] [facility]    Office Visit    1 year ago [facility] physical exam    [address] [facility] Dante Delgado [facility]    Office Visit    3 years ago Essential hypertension    [address] [facility], Dante Delgado MD    Office Visit    3 years ago Neck pain    [address], [address], Dante Delgado MD    Office Visit                            Recent Outpatient Visits              8 months ago Annual physical exam    Adrianne Herrera MD    Office Visit    9 months ago Essential hypertension    [address] [facility]    Office Visit    1 year ago Annual physical exam    [address] [facility] Del[facility]    Office Visit    3 years ago Essential hypertension    [address] [facility], Dante Delgado MD    Office Visit    3 years ago Neck pain    [address], [address], Dante Delgado MD    Office Visit

## 2023-09-14 RX ORDER — AMLODIPINE AND BENAZEPRIL HYDROCHLORIDE 10; 40 MG/1; MG/1
1 CAPSULE ORAL DAILY
Qty: 30 CAPSULE | Refills: 0 | Status: SHIPPED | OUTPATIENT
Start: 2023-09-14

## 2023-09-14 NOTE — TELEPHONE ENCOUNTER
1st attempt/MyChart message was sent to the patient to schedule an appointment per the below request.

## 2023-12-11 RX ORDER — AMLODIPINE AND BENAZEPRIL HYDROCHLORIDE 10; 40 MG/1; MG/1
CAPSULE ORAL
Qty: 30 CAPSULE | Refills: 0 | OUTPATIENT
Start: 2023-12-11

## 2023-12-12 NOTE — TELEPHONE ENCOUNTER
DR Flores =see below====tried phone call, MyChart and letter but with no reply . Pended 30 day supply .     LAST VISIT 12/20/22.    No future appointments.    9/11/23 refill encounter ;  Rinku Flores MD      9/14/23  7:29 AM  Note  Pls call pt; ffup ov needed         September 18, 2023  Childs Kate         9/18/23  1:45 PM  Note  2nd attempt - mychart sent        September 14, 2023  Kayla Dumont   to [facility]   BP    9/14/23  8:31 AM  Note  1st attempt/MyChart message was sent to the patient to schedule an appointment per the below request.           overdue labs and appointment  Message [id number]  From  Milka Mcpherson RN To  Dev Villarreal Sent and Delivered  9/11/2023  8:40 PM   Last Read in Xumii  Not Read     Appointment request  Message [id number]  From  Kayla Dumont To  Dev Villarreal Sent and Delivered  9/14/2023  8:27 AM   Last Read in Xumii  Not Read       NO PHONE RESPONSE letter sent  on 9/12/23.         MEDICATION RECORD :     Disp Refills Start End    amLODIPine Besy-Benazepril HCl 10-40 MG Oral Cap 30 capsule 0 9/14/2023     Sig - Route: Take 1 capsule by mouth daily. Appointment needed for further refills. - Oral    Sent to pharmacy as: amLODIPine Besy-Benazepril HCl 10-40 MG Oral Capsule (LOTREL)    Notes to Pharmacy: 9/12/23 Appointment needed for further refills.    E-Prescribing Status: Receipt confirmed by pharmacy (9/14/2023  7:29 AM CDT)      Pharmacy    [facility] [address] [place] [address] [phone], [phone]             Please review; protocol failed/no protocol.     Requested Prescriptions   Pending Prescriptions Disp Refills    AMLODIPINE BESY-BENAZEPRIL HCL 10-40 MG Oral Cap [Pharmacy Med Name: amLODIPine Besy-Benazepril HCl 10-40 MG Oral Capsule] 30 capsule 0     Sig: TAKE 1 CAPSULE BY MOUTH ONCE DAILY . (9/12/23 APPOINTMENT NEEDED FOR FURTHER REFILLS)       Hypertensive Medications Protocol Failed -  12/9/2023 11:13 AM        Failed - Last BP reading less than 140/90     BP Readings from Last 1 Encounters:   12/20/22 146/80               Failed - CMP or BMP in past 6 months     No results found for this or any previous visit (from the past 4392 hour(s)).            Failed - In person appointment or virtual visit in the past 6 months     Recent Outpatient Visits              11 months ago [facility]    Office Visit    12 months ago Essential hypertension    [facility] Sindi Paz PA-C    Office Visit    2 years ago [facility] physical exam    [facility]    Office Visit    3 [facility]    Office Visit    3 years ago [facility]    Office Visit                      Failed - EGFRCR or GFRNAA > 50     GFR Evaluation            Passed - In person appointment in the past 12 or next 3 months     Recent Outpatient Visits              11 months ago Annual physical exam    [facility] Rinku Bal [facility]    Office Visit    12 months ago Essential hypertension    [facility]    Office Visit    2 years ago Annual physical exam    [facility] Rinku [facility]    Office Visit    3 years ago [facility]    Office Visit    3 years ago Neck pain    [facility] Bal [facility]    Office Visit                            Recent Outpatient Visits              11 months  ago Annual physical exam    [facility] Rinku Flores MD    Office Visit    12 months ago Essential hypertension    [facility]    Office Visit    2 years ago Annual physical exam    [facility] Rinku Flores MD    Office Visit    3 years ago Essential hypertension    [facility] Bal [facility]    Office Visit    3 years ago [facility]    [facility] Visit

## 2023-12-15 RX ORDER — AMLODIPINE AND BENAZEPRIL HYDROCHLORIDE 10; 40 MG/1; MG/1
CAPSULE ORAL
Qty: 30 CAPSULE | Refills: 0 | OUTPATIENT
Start: 2023-12-15

## 2023-12-15 NOTE — TELEPHONE ENCOUNTER
Call Center - Please assist with appointment. Please make 2 call attempts to schedule     Dr. Garcia Elena declined refill : Amlodipine -benazepril     Needs appointment. No future appointments.

## 2023-12-15 NOTE — TELEPHONE ENCOUNTER
Duplicate request, previously addressed. See 12/9/23 Refill encounter - Dr. Gordon Van specifically declined,  needs appointment. Name from pharmacy: amLODIPine Besy-Benazepril HCl 10-40 MG Oral Capsule          Will file in chart as: AMLODIPINE BESY-BENAZEPRIL HCL 10-40 MG Oral Cap     Possible duplicate: Hover to review recent actions on this medication    Sig: TAKE 1 CAPSULE BY MOUTH ONCE DAILY . (9/12/23 APPOINTMENT NEEDED FOR FURTHER REFILLS)    Disp: 30 capsule    Refills: 0    Start: 12/14/2023    Class: Normal    Non-formulary    Last ordered: 3 months ago (9/14/2023) by Abram Sutton MD    Last refill: 9/14/2023    Rx #: [id number]    Hypertensive Medications Protocol [id number]/14/2023 05:45 PM   Protocol Details Last BP reading less than 140/90    CMP or BMP in past 6 months    In person appointment or virtual visit in the past 6 months    EGFRCR or GFRNAA > 50    In person appointment in the past 12 or next 3 months      To be filled at: [facility] , [address] [facility] [phone], [phone]       Past 14 days   Refused 4 days ago (12/11/2023):    Appt required, please call patient   amLODIPine Besy-Benazepril HCl 10-40 MG Oral Cap   Sig: TAKE 1 CAPSULE BY MOUTH ONCE DAILY

## 2023-12-19 NOTE — TELEPHONE ENCOUNTER
Spoke to patient, aware he needs appts, he is asking to be assigned to Dr Gilda Mims w/ his new ins, then he will schedule. He asked if Dr Gilda Mims will refill this medication now because he has not had it in 2 weeks.

## 2023-12-19 NOTE — TELEPHONE ENCOUNTER
Dr. Tacho Arreola    Please see message below concerning appointment    Medication pended for your review / approval ( for 30 days )

## 2023-12-20 RX ORDER — AMLODIPINE AND BENAZEPRIL HYDROCHLORIDE 10; 40 MG/1; MG/1
1 CAPSULE ORAL DAILY
Qty: 30 CAPSULE | Refills: 0 | Status: SHIPPED | OUTPATIENT
Start: 2023-12-20

## 2024-02-08 NOTE — TELEPHONE ENCOUNTER
Dr. Flores - Please note - Staff have specifically discussed with patient, that appointment is needed.   And he agreed.   Yet no appointments have been scheduled. No future appointments.    Last visit 12/20/22     Previously. Only 30-day supplies of amlodipine-benazepril have been sent  Twice: 9/14/23 and 12/20/23    Please advise.

## 2024-02-08 NOTE — TELEPHONE ENCOUNTER
Please Review. Protocol Failed or has no protocol.       Requested Prescriptions   Pending Prescriptions Disp Refills    amLODIPine Besy-Benazepril HCl 10-40 MG Oral Cap 30 capsule 0     Sig: Take 1 capsule by mouth daily. Appointment needed for further refills.       Hypertension Medications Protocol Failed - 2/8/2024 10:18 AM        Failed - CMP or BMP in past 12 months        Failed - Last BP reading less than 140/90     BP Readings from Last 1 Encounters:   12/20/22 146/80               Failed - In person appointment or virtual visit in the past 12 mos or appointment in next 3 mos     Recent Outpatient Visits              1 [facility]    Office Visit    1 year ago Essential hypertension    [facility], Sindi Hill PA-C    Office Visit    2 years ago Annual physical exam    [facility]    Office Visit    3 years ago Essential hypertension    [facility]    Office Visit    4 years ago [facility]    Office Visit                      Failed - EGFRCR or GFRNAA > 50     GFR Evaluation               Recent Outpatient Visits              1 year ago Annual physical exam    [facility] Rinku Bal MD    Office Visit    1 year ago Essential hypertension    [facility], Sindi Hill PA-C    Office Visit    2 years ago Annual physical exam    [facility]    Office Visit    3 years ago Essential hypertension    [facility]    Office Visit    4 years ago Neck  pain    [facility] Rinku Flores MD    Office Visit

## 2024-02-08 NOTE — TELEPHONE ENCOUNTER
Current Outpatient Medications:     amLODIPine Besy-Benazepril HCl 10-40 MG Oral Cap, Take 1 capsule by mouth daily. Appointment needed for further refills., Disp: 30 capsule, Rfl: 0

## 2024-02-09 RX ORDER — AMLODIPINE AND BENAZEPRIL HYDROCHLORIDE 10; 40 MG/1; MG/1
1 CAPSULE ORAL DAILY
Qty: 30 CAPSULE | Refills: 0 | OUTPATIENT
Start: 2024-02-09

## 2024-05-31 ENCOUNTER — HOSPITAL ENCOUNTER (OUTPATIENT)
Age: 64
Discharge: HOME | End: 2024-05-31
Payer: COMMERCIAL

## 2024-05-31 ENCOUNTER — APPOINTMENT (OUTPATIENT)
Dept: GENERAL RADIOLOGY | Age: 64
End: 2024-05-31
Payer: COMMERCIAL

## 2024-05-31 VITALS
HEART RATE: 86 BPM | SYSTOLIC BLOOD PRESSURE: 170 MMHG | OXYGEN SATURATION: 98 % | RESPIRATION RATE: 16 BRPM | DIASTOLIC BLOOD PRESSURE: 95 MMHG | TEMPERATURE: 98 F

## 2024-05-31 DIAGNOSIS — S16.1XXA NECK STRAIN, INITIAL ENCOUNTER: ICD-10-CM

## 2024-05-31 DIAGNOSIS — I10 HYPERTENSION, UNSPECIFIED TYPE: ICD-10-CM

## 2024-05-31 DIAGNOSIS — R60.9 SWELLING: ICD-10-CM

## 2024-05-31 DIAGNOSIS — L03.114 CELLULITIS OF LEFT UPPER EXTREMITY: Primary | ICD-10-CM

## 2024-05-31 PROCEDURE — 73130 X-RAY EXAM OF HAND: CPT

## 2024-05-31 PROCEDURE — 73110 X-RAY EXAM OF WRIST: CPT

## 2024-05-31 PROCEDURE — 99213 OFFICE O/P EST LOW 20 MIN: CPT

## 2024-05-31 RX ORDER — CYCLOBENZAPRINE HCL 10 MG
10 TABLET ORAL 3 TIMES DAILY PRN
Qty: 20 TABLET | Refills: 0 | Status: SHIPPED | OUTPATIENT
Start: 2024-05-31 | End: 2024-06-07

## 2024-05-31 RX ORDER — IBUPROFEN 600 MG/1
600 TABLET ORAL ONCE
Status: COMPLETED | OUTPATIENT
Start: 2024-05-31 | End: 2024-05-31

## 2024-05-31 RX ORDER — CEFADROXIL 500 MG/1
500 CAPSULE ORAL 2 TIMES DAILY
Qty: 20 CAPSULE | Refills: 0 | Status: SHIPPED | OUTPATIENT
Start: 2024-05-31 | End: 2024-06-10

## 2024-05-31 NOTE — ED INITIAL ASSESSMENT (HPI)
Pt presents to the IC with c/o pain to the base of his neck and upper back, denies injury. Pt also reports pain and swelling to the left 1st finger. Pt applied icy hot to the hand. Pt hasn't been on his HTN meds for approx 6 months because he is out of his medication. No chest pain or SOB. Pt has been taking advil for pain relief, last taken this morning.

## 2024-05-31 NOTE — DISCHARGE INSTRUCTIONS
Your x-rays were relatively unremarkable.  You have arthritis in your hands.  I am sending you a prescription for an antibiotic for a skin infection due to the redness and the swelling.  Please call the hand specialist tomorrow and make an appointment for close follow-up early next week.  If you do not see any improvement in your symptoms within the next 3 to 4 days you should see your primary care doctor.    If you have any worsening symptoms including worsening redness or swelling, fever, red streaking, chest pain or shortness of breath or any other concerning complaints you should go to the emergency department.  You can also take Tylenol or Motrin for pain.    Please wash the area 2-3 times a day with plain soap and water.    Please make an appointment to see your primary care doctor next week for a wound check.    Blood pressure is very elevated today.  I reviewed your chart and see that you have not had labs or seen your primary care doctor for over a year.  Before restarting blood pressure medication you need to have your labs rechecked by your primary care doctor.  Please be sure to follow-up with your primary care doctor next week.  If you develop any chest pain, shortness of breath, headache, dizziness, weakness or any other worsening or concerning complaints you should go to the emergency department.  Otherwise please be sure to follow-up with your primary care doctor.    I also sent you a prescription for Flexeril for your neck strain.  Please take it as prescribed.  Please remember that will make you drowsy so do not drive or drink alcohol when you take it.

## 2024-05-31 NOTE — ED PROVIDER NOTES
Patient Seen in: [facility]      History     Chief Complaint   Patient presents with    Neck Pain     Stated Complaint: Pain  Subjective:   Dev is a 64-year-old male presenting to the immediate care with multiple complaints.  Patient states that he has had redness and swelling to his left hand for about the past week.  States that he has pain to his upper neck for the past week.  Also states that he has blood pressure issues that have been unaddressed for quite some time.  Patient states no injury or trauma to the hand.  States that the swelling appeared on its own about a week ago.  Patient states he has not had any weakness, numbness, tingling to his hand or fingers.  Patient has no limitations to range of motion.  Denies any pain to his joints.  Patient states the neck pain began about a week ago also.  Denies any fall, injury or trauma.  No head pain.  No weakness, numbness, tingling to his arms.  No pain to his back.  Patient states pain is worse when he turns his neck from side-to-side.  He does state that he lifts heavy objects frequently for work.  States that he has had diagnosed hypertension for a number of years.  Has been out of his medication for at least 6 months.  Has not seen his PCP in over a year.  Denies any headache, blurred vision, dizziness, weakness, chest pain or shortness of breath.        Objective:   Past Medical History:    Obesity    Other and unspecified hyperlipidemia    Unspecified essential hypertension            Past Surgical History:   Procedure Laterality Date    Cholecystectomy                Social History     Socioeconomic History    Marital status: Single   Tobacco Use    Smoking status: Former     Types: Cigarettes     Start date: 2/28/2017    Smokeless tobacco: Never    Tobacco comments:      3 to 4 cigs a day   Vaping Use    Vaping status: Never Used   Substance and Sexual Activity    Alcohol use: Yes     Alcohol/week: 0.0 standard drinks of alcohol      Comment: social     Drug use: No            Review of Systems    Positive for stated complaint: Pain  Other systems are as noted in HPI.  Constitutional and vital signs reviewed.      All other systems reviewed and negative except as noted above.    Physical Exam     ED Triage Vitals [05/31/24 1709]   BP (!) 170/95   Pulse 86   Resp 16   Temp 97.8 °F (36.6 °C)   Temp src Temporal   SpO2 98 %   O2 Device None (Room air)     Current:BP (!) 170/95   Pulse 86   Temp 97.8 °F (36.6 °C) (Temporal)   Resp 16   SpO2 98%     Physical Exam  Vitals and nursing note reviewed.   Constitutional:       General: He is not in acute distress.     Appearance: Normal appearance. He is not ill-appearing, toxic-appearing or diaphoretic.   HENT:      Head: Normocephalic.   Neck:      Trachea: Trachea and phonation normal.      Comments: Cervical, thoracic, lumbar and sacral spine were palpated and were without any tenderness, crepitus or step-off.  Patient has muscular tenderness to the bilateral upper neck area.  5/5 strength with flexion and extension of bilateral upper arms.  Cardiovascular:      Rate and Rhythm: Normal rate and regular rhythm.      Pulses: Normal pulses.      Heart sounds: Normal heart sounds.   Pulmonary:      Effort: Pulmonary effort is normal.      Breath sounds: Normal breath sounds.   Musculoskeletal:         General: Normal range of motion.      Right wrist: Normal.      Left wrist: Normal.      Right hand: Normal.      Left hand: Swelling and tenderness present. No deformity, lacerations or bony tenderness. Normal range of motion. Normal strength. Normal sensation. Normal capillary refill. Normal pulse.      Cervical back: Full passive range of motion without pain, normal range of motion and neck supple. No edema, erythema, signs of trauma, rigidity, torticollis or crepitus. Muscular tenderness present. No pain with movement or spinous process tenderness. Normal range of motion.      Comments: Positive CMS.   2+ radial and ulnar pulses. Capillary refill is less than 2 seconds.  Patient does have full range of motion to the entire left hand all 5 fingers.  The left upper extremity, wrist, elbow, shoulder unremarkable.  There are no abrasions or lacerations noted.  No bleeding. No ecchymosis noted.  Patient does have tenderness to thenar eminence of the left hand with moderate erythema and swelling.  No obvious deformity noted.  There is no joint tenderness with palpation.  Patient has full range of motion of all of his digits.     Skin:     General: Skin is warm and dry.      Capillary Refill: Capillary refill takes less than 2 seconds.   Neurological:      General: No focal deficit present.      Mental Status: He is alert and oriented to person, place, and time.   Psychiatric:         Mood and Affect: Mood normal.         Behavior: Behavior normal.         Thought Content: Thought content normal.         Judgment: Judgment normal.         ED Course   Radiology:  XR HAND (MIN 3 VIEWS), LEFT (CPT=73130)    Result Date: 5/31/2024  CONCLUSION:  1. No radiographically visible acute osseous injury of the left hand. 2. Advanced 1st carpometacarpal with mild-to-moderate scattered distal greater than proximal interphalangeal joint osteoarthritis.   Dictated by (CST): Roly Munoz MD on 5/31/2024 at 5:58 PM     Finalized by (CST): Roly Munoz MD on 5/31/2024 at 6:00 PM          XR WRIST COMPLETE (MIN 3 VIEWS), LEFT (CPT=73110)    Result Date: 5/31/2024  CONCLUSION:  1. No radiographically visible acute osseous injury of the left wrist. 2. Advanced 1st carpometacarpal joint osteoarthritis.   Dictated by (CST): Roly Munoz MD on 5/31/2024 at 5:57 PM     Finalized by (CST): Roly Munoz MD on 5/31/2024 at 5:58 PM           Labs Reviewed - No data to display    MDM     Medical Decision Making  Differential diagnoses reflecting the complexity of care include but are not limited to bony versus soft tissue swelling to the  left hand, less likely septic joint or septic arthritis.  Separately considered bony versus muscular neck pain.  Comorbidities that add complexity to management include: Untreated hypertension, smoker  History obtained by an independent source was from: Patient  I independently reviewed the patient's x-rays.  Patient is well appearing, non-toxic and in no acute distress.  Vital signs are stable.   History and physical exam are consistent with cellulitis to the left hand.  There is no joint tenderness on palpation.  There is no difficulty with range of motion of the joints of the left thumb or fingers.  X-ray reveals osteoarthritis, no signs of osteomyelitis or fracture.  Sent a prescription for for [place] for cellulitis.  Recommended that if patient does not see any improvement in symptoms within the next 3 to 4 days they should see primary care doctor or go to the ED. Recommended that if patient has any worsening symptoms including worsening redness or swelling, fever, red streaking, chest pain or shortness of breath or any other concerning complaints they should go to the emergency department.  Also recommended Tylenol or Motrin for pain.  Recommended making an appointment to follow-up with the hand specialist if symptoms do not improve within the next 48-72 hours.  Recommended that patient wash the area 2-3 times a day with plain soap and water. Recommended the patient make an appointment to see primary care doctor next week for a wound check.    History and physical exam are consistent with neck strain.  There is no bony neck tenderness.  There was no injury or trauma.  Minimal utility in x-rays given no injury.  Recommended Motrin or Aleve for pain for the next 48 to 72 hours around-the-clock, then as needed.  Sent prescription for Flexeril for severe pain with drowsiness precautions. Recommended lidocaine patches for pain control. Recommended if patient develops any numbness, tingling, acutely worsening pain,  urinary or bowel incontinence or any other concerning complaints they should go to the emergency department.  Recommended if patient has persistent pain for more than the next week they make an appointment to see the physical medicine specialist.  Otherwise recommended follow-up with primary care doctor.    Patient has a history of hypertension.  Has been treated by PCP in the past.  States he has been out of his medicines for at least 6 months.  On chart review patient has last seen his PCP more than a year ago.  Has not had labs done since 2019.  I discussed with the patient the importance of following up with primary care provider and having labs done prior to restarting hypertension medications.  Patient has no red flag hypertension symptoms.  Recommended that if he develops any headache, dizziness, chest pain, shortness of breath or any other worsening or concerning complaints to go to the emergency department.  Otherwise recommended following up with primary care provider.  ED precautions discussed.  Patient (guardian) advised to follow up with PCP in 2-3 days.  Patient (guardian) agrees with this plan of care.  Patient (guardian) verbalizes understanding of discharge instructions and plan of care.      Amount and/or Complexity of Data Reviewed  Radiology: ordered and independent interpretation performed. Decision-making details documented in ED Course.    Risk  OTC drugs.  Prescription drug management.        Disposition and Plan     Clinical Impression:  1. Cellulitis of left upper extremity    2. Swelling    3. Hypertension, unspecified type    4. Neck strain, initial encounter         Disposition:  Discharge  5/31/2024  6:07 pm    Follow-up:  Keo Goldman MD  [address] [place] [id number]  [phone]          Rinku Flores MD  [address] [place] [id number]  [phone]                Medications Prescribed:  Discharge Medication List as of 5/31/2024  6:09 PM         START taking these medications    Details   cefadroxil 500 MG Oral Cap Take 1 capsule (500 mg total) by mouth 2 (two) times daily for 10 days., Normal, Disp-20 capsule, R-0      cyclobenzaprine 10 MG Oral Tab Take 1 tablet (10 mg total) by mouth 3 (three) times daily as needed for Muscle spasms., Normal, Disp-20 tablet, R-0

## (undated) NOTE — LETTER
51/39/72        [address] [facility]      Dear Alfredito Smoker records indicate that you have outstanding lab work and or testing that was ordered for you and has not yet been completed:  Orders Placed This Encounte

## (undated) NOTE — LETTER
[facility], [place] [address] [address] [id number]  Dept: [phone]  Dept Fax: [phone] [place] [address]

## (undated) NOTE — LETTER
82/97/19        [address] [facility]      Dear Neida Haynes records indicate that you have outstanding lab work and or testing that was ordered for you and has not yet been completed:  Orders Placed This Encounte

## (undated) NOTE — LETTER
11/93/12        [address] [facility]      Dear Antonio Villar records indicate that you have outstanding lab work and or testing that was ordered for you and has not yet been completed:  Orders Placed This Encounte

## (undated) NOTE — LETTER
9/12/2023              Ruben Amend        [address]        [place] [facility] [address]         Dear Megha So,    This letter is to inform you that our office has made several attempts to reach you by phone without success. We were attempting to contact you by phone regarding -Need appointment and Labs need to be done. Please contact our office at the number listed below as soon as you receive this letter to discuss this issue and to make the necessary changes in our system to your contact information. Thank you for your cooperation. Sincerely,    Shantell Rao MD  [address] [place] [address]   [phone]        Document electronically generated by:   Rosario Apple RN

## (undated) NOTE — LETTER
46/22/33        [address] [facility]      Dear Simeon Moore records indicate that you have outstanding lab work and or testing that was ordered for you and has not yet been completed:  Orders Placed This Encounte

## (undated) NOTE — MR AVS SNAPSHOT
Veronica  [address]  [phone]               Thank you for choosing us for your health care visit with Leigh Ann Callahan MD.  We are glad to serve you and happy to provide you with this summary insurance company's guidelines for prior authorization for this test.  You may be held responsible for payment in full if proper authorization is not acquired. Please contact the Patient Business Office at [phone] if you have any questions related to i

## (undated) NOTE — MR AVS SNAPSHOT
[address]  [address] [facility]  [phone]               Thank you for choosing us for your health care visit with Jonny Rodriguez MD.  We are glad to serve you and happy to provide you with this s Reason for Today's Visit     Hypertension     Knee Pain           Medical Issues Discussed Today     Chronic pain of left knee    Essential hypertension    Microhematuria    Mixed hyperlipidemia    Numerous moles      Instructions and Information about You Don’t eat while distracted and slow down. Avoid over sized portions. Don’t eat while when you’re bored.      EAT THESE FOODS MORE OFTEN: EAT THESE FOODS LESS OFTEN:   Make half your plate fruits and vegetables Highly refined, white starches including wh

## (undated) NOTE — LETTER
17/[birth year]/69        [address] [facility]      Dear Joya Wing records indicate that you have outstanding lab work and or testing that was ordered for you and has not yet been completed:  Orders Placed This Encounte